# Patient Record
Sex: FEMALE | Race: WHITE | NOT HISPANIC OR LATINO | Employment: OTHER | URBAN - METROPOLITAN AREA
[De-identification: names, ages, dates, MRNs, and addresses within clinical notes are randomized per-mention and may not be internally consistent; named-entity substitution may affect disease eponyms.]

---

## 2022-11-14 ENCOUNTER — OFFICE VISIT (OUTPATIENT)
Dept: FAMILY MEDICINE | Facility: CLINIC | Age: 87
End: 2022-11-14
Payer: COMMERCIAL

## 2022-11-14 VITALS
HEART RATE: 90 BPM | DIASTOLIC BLOOD PRESSURE: 72 MMHG | SYSTOLIC BLOOD PRESSURE: 136 MMHG | OXYGEN SATURATION: 98 % | TEMPERATURE: 98.1 F

## 2022-11-14 DIAGNOSIS — J20.8 ACUTE BRONCHITIS DUE TO OTHER SPECIFIED ORGANISMS: Primary | ICD-10-CM

## 2022-11-14 PROCEDURE — 99203 OFFICE O/P NEW LOW 30 MIN: CPT

## 2022-11-14 RX ORDER — BENZONATATE 100 MG/1
100 CAPSULE ORAL 3 TIMES DAILY PRN
Qty: 30 CAPSULE | Refills: 0 | Status: SHIPPED | OUTPATIENT
Start: 2022-11-14

## 2022-11-14 RX ORDER — LOSARTAN POTASSIUM 50 MG/1
TABLET ORAL
COMMUNITY

## 2022-11-14 RX ORDER — AMOXICILLIN 500 MG/1
500 CAPSULE ORAL 2 TIMES DAILY
Qty: 20 CAPSULE | Refills: 0 | Status: SHIPPED | OUTPATIENT
Start: 2022-11-14 | End: 2022-11-24

## 2022-11-14 RX ORDER — ALBUTEROL SULFATE 90 UG/1
2 AEROSOL, METERED RESPIRATORY (INHALATION) EVERY 6 HOURS
Qty: 18 G | Refills: 0 | Status: SHIPPED | OUTPATIENT
Start: 2022-11-14

## 2022-11-14 ASSESSMENT — ENCOUNTER SYMPTOMS
CARDIOVASCULAR NEGATIVE: 1
CONSTITUTIONAL NEGATIVE: 1
COUGH: 1

## 2022-11-14 NOTE — PATIENT INSTRUCTIONS
Increase fluids with water, Pedialyte, Gatorade, or rehydrating beverages. Alternate Tylenol and Ibuprofen as needed for aches, pains or fever. Follow clear liquid to BRAT diet (bananas, rice, applesauce, toast) as needed for any upset stomach. Rest as much as possible. Use OTC cough and cold medication. Run humidifier at night. Follow up in clinic if symptoms persist or worsen. This usually can last 10-14 days.

## 2022-11-14 NOTE — PROGRESS NOTES
Assessment & Plan     Acute bronchitis due to other specified organisms  - benzonatate (TESSALON) 100 MG capsule  Dispense: 30 capsule; Refill: 0  - albuterol (PROAIR HFA/PROVENTIL HFA/VENTOLIN HFA) 108 (90 Base) MCG/ACT inhaler  Dispense: 18 g; Refill: 0  - amoxicillin (AMOXIL) 500 MG capsule  Dispense: 20 capsule; Refill: 0     If not improved by day 14, fever or worsening symptoms, to use Amoxicillin.    Increase fluids with water, Pedialyte, Gatorade, or rehydrating beverages. Alternate Tylenol and Ibuprofen as needed for aches, pains or fever. Follow clear liquid to BRAT diet (bananas, rice, applesauce, toast) as needed for any upset stomach. Rest as much as possible. Use OTC cough and cold medication. Run humidifier at night. Follow up in clinic if symptoms persist or worsen. This usually can last 10-14 days.       Return if symptoms worsen or fail to improve, for Follow up.    At the end of the encounter, I discussed results, diagnosis, medications. Discussed red flags for immediate return to clinic/ER, as well as indications for follow up if no improvement. Patient understood and agreed to plan. Patient was stable for discharge.    Subjective     Lizzeth is a 89 year old female who presents to clinic today with daughter for the following health issues:  Chief Complaint   Patient presents with     Cough     Has been going on for a while, short of breath easily, dry cough, no history of chronic breathing issues, dry throat, says she feels like she can never get a full breath of air, did a COVID test at home and it was negative on 11/10/2022     Lizzeth presents [facility] reports of cough x 1.5 weeks. She reports it is a dry cough, hard to catch breath. She had a negative COVID test. She has not tried any over the counter medications.           Review of Systems   Constitutional: Negative.    HENT: Negative.    Respiratory: Positive for cough.    Cardiovascular: Negative.    Skin: Negative.            Objective    BP  136/72 (BP Location: Right arm, Patient Position: Sitting, Cuff Size: Adult Regular)   Pulse 90   Temp 98.1  F (36.7  C) (Oral)   SpO2 98%   Physical Exam  Constitutional:       Appearance: Normal appearance.   HENT:      Head: Normocephalic and atraumatic.      Left Ear: There is impacted cerumen.      Nose: Congestion present.      Mouth/Throat:      Mouth: Mucous membranes are moist.      Pharynx: Oropharynx is clear.   Eyes:      Extraocular Movements: Extraocular movements intact.      Conjunctiva/sclera: Conjunctivae normal.      Pupils: Pupils are equal, round, and reactive to light.   Cardiovascular:      Rate and Rhythm: Normal rate and regular rhythm.      Heart sounds: Normal heart sounds.   Pulmonary:      Effort: Pulmonary effort is normal.      Breath sounds: Normal breath sounds.   Musculoskeletal:      Cervical back: Normal range of motion and neck supple.   Skin:     General: Skin is warm and dry.   Neurological:      General: No focal deficit present.      Mental Status: She is alert and oriented to person, place, and time.              Josse Calloway PA-C

## 2023-05-27 ENCOUNTER — HEALTH MAINTENANCE LETTER (OUTPATIENT)
Age: 88
End: 2023-05-27

## 2024-08-04 ENCOUNTER — HEALTH MAINTENANCE LETTER (OUTPATIENT)
Age: 89
End: 2024-08-04

## 2024-09-09 ENCOUNTER — APPOINTMENT (OUTPATIENT)
Dept: CT IMAGING | Facility: CLINIC | Age: 89
End: 2024-09-09
Attending: EMERGENCY MEDICINE
Payer: COMMERCIAL

## 2024-09-09 ENCOUNTER — APPOINTMENT (OUTPATIENT)
Dept: GENERAL RADIOLOGY | Facility: CLINIC | Age: 89
End: 2024-09-09
Payer: COMMERCIAL

## 2024-09-09 ENCOUNTER — HOSPITAL ENCOUNTER (EMERGENCY)
Facility: CLINIC | Age: 89
Discharge: HOME | End: 2024-09-09
Attending: EMERGENCY MEDICINE | Admitting: EMERGENCY MEDICINE
Payer: COMMERCIAL

## 2024-09-09 VITALS
DIASTOLIC BLOOD PRESSURE: 66 MMHG | WEIGHT: 121 LBS | TEMPERATURE: 97.7 F | HEART RATE: 89 BPM | OXYGEN SATURATION: 94 % | RESPIRATION RATE: 16 BRPM | SYSTOLIC BLOOD PRESSURE: 143 MMHG

## 2024-09-09 DIAGNOSIS — S32.10XA CLOSED FRACTURE OF SACRUM, UNSPECIFIED PORTION OF SACRUM, INITIAL ENCOUNTER (H): ICD-10-CM

## 2024-09-09 DIAGNOSIS — K57.92 DIVERTICULITIS: ICD-10-CM

## 2024-09-09 LAB
ALBUMIN UR-MCNC: NEGATIVE MG/DL
AMORPH CRY #/AREA URNS HPF: ABNORMAL /HPF
ANION GAP SERPL CALCULATED.3IONS-SCNC: 14 MMOL/L (ref 7–15)
APPEARANCE UR: CLEAR
BASOPHILS # BLD AUTO: 0 10E3/UL (ref 0–0.2)
BASOPHILS NFR BLD AUTO: 0 %
BILIRUB UR QL STRIP: NEGATIVE
BUN SERPL-MCNC: 24.5 MG/DL (ref 8–23)
CALCIUM SERPL-MCNC: 9.6 MG/DL (ref 8.8–10.4)
CHLORIDE SERPL-SCNC: 98 MMOL/L (ref 98–107)
CK SERPL-CCNC: 142 U/L (ref 26–192)
COLOR UR AUTO: ABNORMAL
CREAT SERPL-MCNC: 1.11 MG/DL (ref 0.51–0.95)
EGFRCR SERPLBLD CKD-EPI 2021: 46 ML/MIN/1.73M2
EOSINOPHIL # BLD AUTO: 0 10E3/UL (ref 0–0.7)
EOSINOPHIL NFR BLD AUTO: 0 %
ERYTHROCYTE [DISTWIDTH] IN BLOOD BY AUTOMATED COUNT: 12.9 % (ref 10–15)
FLUAV RNA SPEC QL NAA+PROBE: NEGATIVE
FLUBV RNA RESP QL NAA+PROBE: NEGATIVE
GLUCOSE SERPL-MCNC: 128 MG/DL (ref 70–99)
GLUCOSE UR STRIP-MCNC: NEGATIVE MG/DL
HCO3 SERPL-SCNC: 22 MMOL/L (ref 22–29)
HCT VFR BLD AUTO: 39.8 % (ref 35–47)
HGB BLD-MCNC: 13.4 G/DL (ref 11.7–15.7)
HGB UR QL STRIP: NEGATIVE
IMM GRANULOCYTES # BLD: 0.1 10E3/UL
IMM GRANULOCYTES NFR BLD: 1 %
KETONES UR STRIP-MCNC: 10 MG/DL
LEUKOCYTE ESTERASE UR QL STRIP: NEGATIVE
LYMPHOCYTES # BLD AUTO: 0.8 10E3/UL (ref 0.8–5.3)
LYMPHOCYTES NFR BLD AUTO: 8 %
MAGNESIUM SERPL-MCNC: 2 MG/DL (ref 1.7–2.3)
MCH RBC QN AUTO: 31.2 PG (ref 26.5–33)
MCHC RBC AUTO-ENTMCNC: 33.7 G/DL (ref 31.5–36.5)
MCV RBC AUTO: 93 FL (ref 78–100)
MONOCYTES # BLD AUTO: 1 10E3/UL (ref 0–1.3)
MONOCYTES NFR BLD AUTO: 10 %
MUCOUS THREADS #/AREA URNS LPF: PRESENT /LPF
NEUTROPHILS # BLD AUTO: 8.1 10E3/UL (ref 1.6–8.3)
NEUTROPHILS NFR BLD AUTO: 81 %
NITRATE UR QL: NEGATIVE
NRBC # BLD AUTO: 0 10E3/UL
NRBC BLD AUTO-RTO: 0 /100
PH UR STRIP: 5.5 [PH] (ref 5–7)
PLATELET # BLD AUTO: 256 10E3/UL (ref 150–450)
POTASSIUM SERPL-SCNC: 4.1 MMOL/L (ref 3.4–5.3)
RBC # BLD AUTO: 4.29 10E6/UL (ref 3.8–5.2)
RBC URINE: <1 /HPF
RSV RNA SPEC NAA+PROBE: NEGATIVE
SARS-COV-2 RNA RESP QL NAA+PROBE: NEGATIVE
SODIUM SERPL-SCNC: 134 MMOL/L (ref 135–145)
SP GR UR STRIP: 1.01 (ref 1–1.03)
SQUAMOUS EPITHELIAL: <1 /HPF
UROBILINOGEN UR STRIP-MCNC: NORMAL MG/DL
WBC # BLD AUTO: 9.9 10E3/UL (ref 4–11)
WBC URINE: 0 /HPF

## 2024-09-09 PROCEDURE — 72192 CT PELVIS W/O DYE: CPT

## 2024-09-09 PROCEDURE — 36415 COLL VENOUS BLD VENIPUNCTURE: CPT

## 2024-09-09 PROCEDURE — 83735 ASSAY OF MAGNESIUM: CPT | Performed by: EMERGENCY MEDICINE

## 2024-09-09 PROCEDURE — 96360 HYDRATION IV INFUSION INIT: CPT | Mod: 59

## 2024-09-09 PROCEDURE — 70450 CT HEAD/BRAIN W/O DYE: CPT

## 2024-09-09 PROCEDURE — 82374 ASSAY BLOOD CARBON DIOXIDE: CPT

## 2024-09-09 PROCEDURE — 85025 COMPLETE CBC W/AUTO DIFF WBC: CPT

## 2024-09-09 PROCEDURE — 96361 HYDRATE IV INFUSION ADD-ON: CPT

## 2024-09-09 PROCEDURE — 82565 ASSAY OF CREATININE: CPT

## 2024-09-09 PROCEDURE — 99284 EMERGENCY DEPT VISIT MOD MDM: CPT | Mod: 25

## 2024-09-09 PROCEDURE — 72131 CT LUMBAR SPINE W/O DYE: CPT

## 2024-09-09 PROCEDURE — 258N000003 HC RX IP 258 OP 636: Performed by: EMERGENCY MEDICINE

## 2024-09-09 PROCEDURE — 87637 SARSCOV2&INF A&B&RSV AMP PRB: CPT

## 2024-09-09 PROCEDURE — 71046 X-RAY EXAM CHEST 2 VIEWS: CPT

## 2024-09-09 PROCEDURE — 82550 ASSAY OF CK (CPK): CPT | Performed by: EMERGENCY MEDICINE

## 2024-09-09 PROCEDURE — 81003 URINALYSIS AUTO W/O SCOPE: CPT

## 2024-09-09 RX ADMIN — SODIUM CHLORIDE 1000 ML: 9 INJECTION, SOLUTION INTRAVENOUS at 07:07

## 2024-09-09 ASSESSMENT — COLUMBIA-SUICIDE SEVERITY RATING SCALE - C-SSRS
1. IN THE PAST MONTH, HAVE YOU WISHED YOU WERE DEAD OR WISHED YOU COULD GO TO SLEEP AND NOT WAKE UP?: NO
6. HAVE YOU EVER DONE ANYTHING, STARTED TO DO ANYTHING, OR PREPARED TO DO ANYTHING TO END YOUR LIFE?: NO
2. HAVE YOU ACTUALLY HAD ANY THOUGHTS OF KILLING YOURSELF IN THE PAST MONTH?: NO

## 2024-09-09 ASSESSMENT — ACTIVITIES OF DAILY LIVING (ADL)
ADLS_ACUITY_SCORE: 33
ADLS_ACUITY_SCORE: 35

## 2024-09-09 NOTE — ED PROVIDER NOTES
ED APC SUPERVISION NOTE:   I evaluated this patient in conjunction with Svetlana Fitzgerald PA-C  I have participated in the care of the patient and personally performed key elements of the history, exam, and medical decision making.      HPI:   Lizzeth العلي is a 89 year old female with a history of hypertension and new Alzheimer's diagnosis who presents to the ED with generalized body aches and hallucinations. She explains that over the past day she has developed generalized weakness and body aches that have gradually worsened since onset. She notes her body aches are most severe at her lower back near the tailbone. She adds that yesterday evening she experienced visual hallucinations. She states she is not having these hallucinations now. It is unclear whether she has suffered similar such hallucinations in the past. She denies vomiting, diarrhea, constipation, dysuria, frequency, or urgency. She has not suffered any recent falls/trauma.     Independent Historian:   Patients daughter accompanies her in the ED and aids in providing history.     Review of External Notes:   Reviewed patient's office visit from 7/22/2024, patient was seen for her Medicare annual visit.  Saw her neurologist and was diagnosed with Alzheimer's, was started on donepezil and Namenda.  She lives in 1 level home independently.  Family comes over to give her meds and helps her with her meals.  She has not had any reported falls.  She does have uterine prolapse, but could not tolerate a pessary.  Was not having any problems with urination or bowel movements.     EXAM:   General: Well-nourished, resting comfortably when I enter the room  Eyes: Pupils equal, conjunctivae pink no scleral icterus or conjunctival injection  ENT:  Moist mucus membranes  Respiratory:  Lungs clear to auscultation bilaterally, no crackles/rubs/wheezes.  Good air movement  CV: Normal rate and rhythm, no murmurs  GI:  Abdomen soft and non-distended.  No  tenderness, guarding or rebound  Skin: Warm, dry.  No rashes or petechiae  Musculoskeletal: No peripheral edema or calf tenderness. Tenderness to palpation of the tailbone. No bruising or abrasions. No midline tenderness of the rest of the spine.   Neuro: Alert and oriented to person/place/time. 5/5 strength in the upper and lower extremities. Sensation intact in the upper and lower extremities.  Psychiatric: Normal affect    Independent Interpretation (X-rays, CTs, rhythm strip):  None    Consultations/Discussion of Management or Tests:  None     Social Determinants of Health affecting care:   None     MEDICAL DECISION MAKING/ASSESSMENT AND PLAN:   89-year-old female with a history of hypertension and Alzheimer's presents to the emergency department with a complaint of generalized bodyaches and hallucinations.  Patient reports that she started to have body aches last night.  She also reports seeing things that were not there.  She is not currently having hallucinations.  She is not having any pain with urination.  No fevers, vomiting, abdominal pain, diarrhea.  She states that she took some Tylenol this morning but her pain is still there.  She localizes the pain to her tailbone, but also complains of achiness in her low back.  She denies any falls.  She does live alone and her family does check on her.  On exam patient is well-appearing.  She is able to move around in the bed with ease.  She rolls over for an exam of her back.  No bruising, abrasions, step-offs.  There is some slight tenderness to palpation of her tailbone and lumbar spine.  No neurologic deficits.  Abdomen is soft and nontender.  Workup does not reveal any flu, COVID, RSV.  No signs of urinary tract infection.  She is not in rhabdomyolysis.  Patient's creatinine is about at her baseline, and she is given a liter of fluid here.  Other electrolytes are within acceptable limits.  No leukocytosis.  Head CT does not show any acute findings.  CT of the  lumbar spine does not show any acute findings.  CT of the sacrum shows acute diverticulitis without abscess or perforation as well as a chronic sacral fracture.  Patient is not having any bloody stools.  Patient will be placed on Augmentin.  I am not sure if her hallucinations are secondary to her infection, or progression of her Alzheimer's.  She will follow-up with primary care.  She will also follow-up with primary care for the chronic sacral fracture, I do not think that she needs to see orthopedics emergently, as this does appear chronic.  Patient's vital signs have resolved, and her tachycardia has resolved after fluid rehydration.  Patient and her family feel comfortable with her going home.  Patient is discharged.     DIAGNOSIS:     ICD-10-CM    1. Diverticulitis  K57.92       2. Closed fracture of sacrum, unspecified portion of sacrum, initial encounter (H)  S32.10XA           DISPOSITION:   Patient is discharged home     Scribe Disclosure:  CHAR PIRES, am serving as a scribe at 6:37 AM on 9/9/2024 to document services personally performed by Anne Lane MD based on my observations and the provider's statements to me.     9/9/2024  [facility] EMERGENCY DEPT       Anne Lane MD  09/09/24 1034

## 2024-09-09 NOTE — ED TRIAGE NOTES
Pt brought in by family for nontraumatic back pain last two weeks that has been getting worse. Pt lives alone, but family checks on pt daily - more weak in the last couple days and daughter reports hallucinations since yesterday. Denies fevers. Denies burning with urination, but reports chronic frequency with urination.      Triage Assessment (Adult)       Row Name 09/09/24 0628          Respiratory WDL    Respiratory WDL WDL        Cardiac WDL    Cardiac WDL WDL        Cognitive/Neuro/Behavioral WDL    Cognitive/Neuro/Behavioral WDL WDL

## 2024-09-09 NOTE — ED PROVIDER NOTES
"  Emergency Department Note      History of Present Illness     Chief Complaint   Hallucinations and Generalized Body Aches      HPI   Lizzeth العلي is a 89 year old female with a history of hypertension and Alzheimer's disease who presents to the ED with generalized weakness and hallucinations.  Patient currently lives at home independently with significant help from family.  They report that over the past few days she has been having generalized weakness.  Last night she felt too weak to get up out of bed.  Additionally the patient reports that she started \"seeing things\" that were not there.  Reports that this has not happened to her before in the past.  Does additionally report that she had with her daughter thinks is atraumatic low back pain that began 2 weeks ago.  She has been taking Tylenol for this as needed.  Patient denies fevers.  Does have what she describes as a chronic cough.  No chest pain or shortness of breath.  Denies abdominal pain.  Does wear briefs at baseline.  Daughter does report that they do bring the patient food and water and she has been eating small amounts but this is typical for her.  Denies falls.    Independent Historian   Daughter as detailed above.    Review of External Notes   None     Past Medical History     Medical History and Problem List   Abdominal hernia  Iron deficiency anemia  CKD, stage 3  Hypertension  Pseudophakia  Alzheimer's disease  B12 Deficiency  Ventral hernia  Cholelithiasis      Medications   Albuterol  Tessalon  Fergon  Cozaar  Vitamin B-12  Senokot  Dilaudid  Aricept  Nameda    Surgical History   Tonsillectomy  Colonoscopy  Hysterectomy  Cataract removal   Inguinal hernia repair  AMBER and BSO    Physical Exam     Patient Vitals for the past 24 hrs:   BP Temp Pulse Resp SpO2 Weight   09/09/24 1034 -- -- -- -- 94 % --   09/09/24 1033 (!) 143/66 -- 89 -- -- --   09/09/24 0627 (!) 151/60 97.7  F (36.5  C) 110 16 94 % 54.9 kg (121 lb)     Physical " Exam  General: Awake, alert, non-toxic. Elderly female.   Head:  Scalp is atraumatic.   Eyes:  Conjunctiva normal, PERRL  ENT:  The external nose and ears are normal.     Dry mucous membranes. Oropharynx clear, uvula midline.  Neck:  Normal range of motion without rigidity.  CV:  Regular rate and rhythm    No pathologic murmur, rubs, or gallops.  Resp:  Breath sounds are clear bilaterally    Non-labored, no retractions or accessory muscle use  Abdomen: Abdomen is soft, no distension, no tenderness, no masses. No CVA tenderness.  MS:  No lower extremity edema/swelling. Mild tenderness to palpation of sacrum. No midline cervical, thoracic, or lumbar tenderness.  Extremities without joint swelling or redness.  Skin:  Warm and dry, No rash or lesions noted.  Neuro:  Alert and oriented.  GCS 15 Moves all extremities normal.  No facial asymmetry. Gait normal.  Psych: Awake. Alert. Normal affect. Appropriate interactions.      Diagnostics     Lab Results   Labs Ordered and Resulted from Time of ED Arrival to Time of ED Departure   BASIC METABOLIC PANEL - Abnormal       Result Value    Sodium 134 (*)     Potassium 4.1      Chloride 98      Carbon Dioxide (CO2) 22      Anion Gap 14      Urea Nitrogen 24.5 (*)     Creatinine 1.11 (*)     GFR Estimate 46 (*)     Calcium 9.6      Glucose 128 (*)    ROUTINE UA WITH MICROSCOPIC REFLEX TO CULTURE - Abnormal    Color Urine Light Yellow      Appearance Urine Clear      Glucose Urine Negative      Bilirubin Urine Negative      Ketones Urine 10 (*)     Specific Gravity Urine 1.009      Blood Urine Negative      pH Urine 5.5      Protein Albumin Urine Negative      Urobilinogen Urine Normal      Nitrite Urine Negative      Leukocyte Esterase Urine Negative      Mucus Urine Present (*)     Amorphous Crystals Urine Few (*)     RBC Urine <1      WBC Urine 0      Squamous Epithelials Urine <1     INFLUENZA A/B, RSV, & SARS-COV2 PCR - Normal    Influenza A PCR Negative      Influenza B PCR  Negative      RSV PCR Negative      SARS CoV2 PCR Negative     CK TOTAL - Normal         MAGNESIUM - Normal    Magnesium 2.0     CBC WITH PLATELETS AND DIFFERENTIAL    WBC Count 9.9      RBC Count 4.29      Hemoglobin 13.4      Hematocrit 39.8      MCV 93      MCH 31.2      MCHC 33.7      RDW 12.9      Platelet Count 256      % Neutrophils 81      % Lymphocytes 8      % Monocytes 10      % Eosinophils 0      % Basophils 0      % Immature Granulocytes 1      NRBCs per 100 WBC 0      Absolute Neutrophils 8.1      Absolute Lymphocytes 0.8      Absolute Monocytes 1.0      Absolute Eosinophils 0.0      Absolute Basophils 0.0      Absolute Immature Granulocytes 0.1      Absolute NRBCs 0.0         Imaging   CT Pelvis Bone wo Contrast   Final Result   IMPRESSION:   1.  Colonic diverticulosis with CT findings concerning for acute   sigmoid diverticulitis near the junction of the rectum and sigmoid   colon. Considerable left-sided pericolonic inflammatory stranding and   focal free fluid in the left pelvis inferiorly. No definitive   well-defined drainable fluid collection currently. Follow up until   resolution is achieved is recommended.   2.  Age uncertain more likely subacute/chronic minimally displaced mid   sacral fracture.   3.  Degenerative change lower lumbar spine with grade 1   anterolisthesis of L4 on L5 and L5 on S1.   4.  Degenerative change both SI joints, symphysis pubis and each hip   joint.   5.  Additional incidental findings as detailed fully above.      NOTE: ABNORMAL REPORT      THE DICTATION ABOVE DESCRIBES AN ABNORMAL REPORT FOR WHICH FOLLOW-UP   IS NEEDED.            MIHAI BEE MD            SYSTEM ID:  [id number]      CT Lumbar Spine w/o Contrast   Final Result   IMPRESSION:     1. No acute fracture or subluxation in the lumbar spine.   2. Degenerative changes in lumbar spine as detailed above. No spinal   canal narrowing at any level. Mild right neural foraminal narrowing at   L2-L3.        CARLOS ALBERTO HARRIS MD            SYSTEM ID:  [id number]      Head CT w/o contrast   Final Result   IMPRESSION:      1. No evidence of acute intracranial hemorrhage, mass, or herniation.   2. Mild diffuse parenchymal volume loss and white matter changes   likely due to chronic microvascular ischemic disease.         CARLOS ALBERTO HARRIS MD            SYSTEM ID:  [id number]      XR Chest 2 Views   Final Result   IMPRESSION: Bibasilar atelectasis. No pleural effusion or   pneumothorax. Normal heart size. Stable to slight increased size of   the large hiatal hernia. Left humerus plate and screw fixation.      DENEEN MEYERS MD            SYSTEM ID:  [id number]        Independent Interpretation   None    ED Course      Medications Administered   Medications   sodium chloride 0.9% BOLUS 1,000 mL (0 mLs Intravenous Stopped 9/9/24 1029)       Procedures   Procedures     Discussion of Management   None    ED Course        Additional Documentation  None    Medical Decision Making / Diagnosis     CMS Diagnoses: None    MIPS       None    MDM   Lizzeth العلي is a 89 year old female who presented to the emergency department for evaluation of generalized weakness and hallucinations.  See HPI for further details.  On exam the patient is hemodynamically stable. Dry mucous membranes.  There is some mild tenderness to palpation of the sacrum.  Broad workup pursued.  COVID and influenza swab negative.  Urinalysis without evidence of infection.  BMP did show mild hyponatremia and mild increase in creatinine, consistent with dehydration.  Patient given 1 L of fluids here in the ED.  CK within normal limits.  Other electrolytes are within normal limits.  Due to the patient having Alzheimer's and living at home somewhat independently, CT of the head ordered which thankfully showed no evidence of acute intracranial hemorrhage.  Lumbar spine CT showed no evidence of fracture.  CT of the pelvis did show incidental findings of acute sigmoid diverticulitis.   Additional finding of a subacute/chronic sacral fracture.  Chest x-ray unremarkable.  Patient denies any hematochezia or diarrhea.  She has no left lower quadrant pain to palpation.  However due to the patient's general malaise and CT findings, elected to treat for diverticulitis.  Patient is at her baseline.  Denies any hallucinations.  A discussion was had with daughters present who report that they feel comfortable with her going home.  Patient discharged home with a course of Augmentin.  Do suspect some of her hallucinations may be due to progression of Alzheimer's versus infection.  Recommend the patient follow-up with her primary care provider for further evaluation of hallucinations and management of the sacral fracture.  Do not think she requires emergent orthopedics referral as this fracture does appear chronic.  All questions answered.  Return precautions discussed.  Patient discharged home.    Disposition   The patient was discharged.     Diagnosis     ICD-10-CM    1. Diverticulitis  K57.92       2. Closed fracture of sacrum, unspecified portion of sacrum, initial encounter (H)  S32.10XA            Discharge Medications   Discharge Medication List as of 9/9/2024 10:29 AM        START taking these medications    Details   amoxicillin-clavulanate (AUGMENTIN) 875-125 MG tablet Take 1 tablet by mouth every 8 hours for 5 days., Disp-15 tablet, R-0, E-Prescribe               TRIP Price THOMAS BRUMM, am serving as a scribe at 6:32 AM on 9/9/2024 to document services personally performed by Svetlana Fitzgerald PA-C based on my observations and the provider's statements to me.      Svetlana Fitzgerald PA-C  09/09/24 0339

## 2024-09-09 NOTE — DISCHARGE INSTRUCTIONS
Discharge Instructions  Diverticulitis    Your provider has diagnosed you with diverticulitis.  Diverticulitis is inflammation of a diverticulum, which is a tiny sack-like structure that protrudes off the wall of the colon (large intestine).  These sacks are created over years of increased pressure in the colon (this is diverticulosis), usually as a result of a diet without enough fruits, vegetables, and whole grains. Diverticulosis doesn't cause symptoms.    When these sacks get inflamed, it is called divericulitis.  Diverticulitis causes abdominal (belly) pain, fever, nausea (sick to stomach), and vomiting (throwing up). Diverticulitis is usually treated at home.  However, sometimes diverticulitis needs treatment in the hospital and may even need surgery.      Generally, every Emergency Department visit should have a follow-up clinic visit with either a primary or a specialty clinic/provider. Please follow-up as instructed by your emergency provider today.    Return to the Emergency Department if:   You get an oral temperature above 102oF or as directed by your provider.  You have blood in your stools (bright red or black, tarry stools), or have blood in your vomit.  You keep vomiting or cannot drink liquids or cannot keep your medicine down.  You cannot have a bowel movement or you cannot pass gas.  Your stomach gets bloated or bigger.  You faint or become very weak.  Your pain is too bad to tolerate.  You have new symptoms or anything that worries you.    What can I do to help myself? How is diverticulitis treated?  Diverticulitis can be treated without antibiotics in many cases. In the past, diverticulitis was routinely treated with antibiotics.  However, research over the past decade has found that antibiotic use in most cases of diverticulitis does not improve healing because the process may be more inflammatory, rather than infectious.  Antibiotics also wipe out good, healthy gut bacteria, which we are  "increasingly aware are important to overall health.  Some patients with certain medical conditions, lab results, or duration of symptoms may still need antibiotics. If you were directed to take antibiotics, take them right away and be sure to finish the whole antibiotic prescription.  Regardless of whether or not you are treated with antibiotics, for the first day or two at home drink only clear liquids.  This lets your intestines rest.  If your pain has improved after one or two days, you may start eating mild foods. Soda crackers, toast, plain noodles, gelatin, applesauce and bananas are good first choices.  Avoid foods that have acid, are spicy, fatty or fibrous (such as meats, coarse grains, vegetables). You may start eating these foods again in about 3-4 days when you are better.  Once you are back to normal, eat a high fiber diet of fruits, vegetables and whole grains. Some people think you should avoid eating nuts, seeds, and corn, but there is no definite proof this makes any difference in whether you will get diverticulitis again.   Pain and fever can be treated with acetaminophen (Tylenol ), ibuprofen (Advil ) or you might have been prescribed a pain medication.  Hot packs or a heating pad may also feel good.    When Should I Follow Up After I Am Feeling Better?  Follow-up as directed by your provider during your visit.  If this was your first episode of diverticulitis, you should have a colonoscopy within 6-8 weeks because in a small number of patients, malignancy (cancer) can look like diverticulitis.   You may not need a colonoscopy if you recently had one. Talk to your primary care doctor to determine if you need a colonoscopy.    Probiotics: If you have been given an antibiotic, you may want to also take a probiotic pill or eat yogurt with live cultures. Probiotics have \"good bacteria\" to help your intestines stay healthy. Studies have shown that probiotics help prevent diarrhea and other intestine " problems (including C. diff infection) when you take antibiotics. You can buy these without a prescription in the pharmacy section of the store.    If you were given a prescription for medicine here today, be sure to read all of the information (including the package insert) that comes with your prescription.  This will include important information about the medicine, its side effects, and any warnings that you need to know about.  The pharmacist who fills the prescription can provide more information and answer questions you may have about the medicine.  If you have questions or concerns that the pharmacist cannot address, please call or return to the Emergency Department.     Remember that you can always come back to the Emergency Department if you are not able to see your regular provider in the amount of time listed above, if you get any new symptoms, or if there is anything that worries you.      If anything changes please return to the ED.

## 2025-03-13 ENCOUNTER — APPOINTMENT (OUTPATIENT)
Dept: CT IMAGING | Facility: CLINIC | Age: OVER 89
End: 2025-03-13
Attending: EMERGENCY MEDICINE
Payer: COMMERCIAL

## 2025-03-13 ENCOUNTER — HOSPITAL ENCOUNTER (EMERGENCY)
Facility: CLINIC | Age: OVER 89
Discharge: HOME | End: 2025-03-13
Attending: EMERGENCY MEDICINE
Payer: COMMERCIAL

## 2025-03-13 VITALS
DIASTOLIC BLOOD PRESSURE: 81 MMHG | RESPIRATION RATE: 16 BRPM | OXYGEN SATURATION: 94 % | HEART RATE: 84 BPM | SYSTOLIC BLOOD PRESSURE: 161 MMHG | TEMPERATURE: 98.1 F

## 2025-03-13 DIAGNOSIS — L03.211 CELLULITIS OF FACE: ICD-10-CM

## 2025-03-13 DIAGNOSIS — B02.9 HERPES ZOSTER WITHOUT COMPLICATION: ICD-10-CM

## 2025-03-13 LAB
ANION GAP SERPL CALCULATED.3IONS-SCNC: 8 MMOL/L (ref 7–15)
BASOPHILS # BLD AUTO: 0 10E3/UL (ref 0–0.2)
BASOPHILS NFR BLD AUTO: 1 %
BUN SERPL-MCNC: 20.4 MG/DL (ref 8–23)
CALCIUM SERPL-MCNC: 9.3 MG/DL (ref 8.8–10.4)
CHLORIDE SERPL-SCNC: 99 MMOL/L (ref 98–107)
CREAT SERPL-MCNC: 0.9 MG/DL (ref 0.51–0.95)
EGFRCR SERPLBLD CKD-EPI 2021: 59 ML/MIN/1.73M2
EOSINOPHIL # BLD AUTO: 0.1 10E3/UL (ref 0–0.7)
EOSINOPHIL NFR BLD AUTO: 1 %
ERYTHROCYTE [DISTWIDTH] IN BLOOD BY AUTOMATED COUNT: 13.4 % (ref 10–15)
GLUCOSE SERPL-MCNC: 114 MG/DL (ref 70–99)
HCO3 SERPL-SCNC: 27 MMOL/L (ref 22–29)
HCT VFR BLD AUTO: 41.6 % (ref 35–47)
HGB BLD-MCNC: 13.6 G/DL (ref 11.7–15.7)
HOLD SPECIMEN: NORMAL
IMM GRANULOCYTES # BLD: 0 10E3/UL
IMM GRANULOCYTES NFR BLD: 0 %
LYMPHOCYTES # BLD AUTO: 0.7 10E3/UL (ref 0.8–5.3)
LYMPHOCYTES NFR BLD AUTO: 14 %
MCH RBC QN AUTO: 30 PG (ref 26.5–33)
MCHC RBC AUTO-ENTMCNC: 32.7 G/DL (ref 31.5–36.5)
MCV RBC AUTO: 92 FL (ref 78–100)
MONOCYTES # BLD AUTO: 0.8 10E3/UL (ref 0–1.3)
MONOCYTES NFR BLD AUTO: 15 %
NEUTROPHILS # BLD AUTO: 3.4 10E3/UL (ref 1.6–8.3)
NEUTROPHILS NFR BLD AUTO: 69 %
NRBC # BLD AUTO: 0 10E3/UL
NRBC BLD AUTO-RTO: 0 /100
PLATELET # BLD AUTO: 224 10E3/UL (ref 150–450)
POTASSIUM SERPL-SCNC: 5.7 MMOL/L (ref 3.4–5.3)
RBC # BLD AUTO: 4.53 10E6/UL (ref 3.8–5.2)
SODIUM SERPL-SCNC: 134 MMOL/L (ref 135–145)
WBC # BLD AUTO: 5 10E3/UL (ref 4–11)

## 2025-03-13 PROCEDURE — 250N000011 HC RX IP 250 OP 636: Performed by: EMERGENCY MEDICINE

## 2025-03-13 PROCEDURE — 70481 CT ORBIT/EAR/FOSSA W/DYE: CPT

## 2025-03-13 PROCEDURE — 99285 EMERGENCY DEPT VISIT HI MDM: CPT | Mod: 25

## 2025-03-13 PROCEDURE — 85004 AUTOMATED DIFF WBC COUNT: CPT | Performed by: EMERGENCY MEDICINE

## 2025-03-13 PROCEDURE — 250N000009 HC RX 250: Performed by: EMERGENCY MEDICINE

## 2025-03-13 PROCEDURE — 70450 CT HEAD/BRAIN W/O DYE: CPT

## 2025-03-13 PROCEDURE — 80048 BASIC METABOLIC PNL TOTAL CA: CPT | Performed by: EMERGENCY MEDICINE

## 2025-03-13 PROCEDURE — 85041 AUTOMATED RBC COUNT: CPT | Performed by: EMERGENCY MEDICINE

## 2025-03-13 PROCEDURE — 36415 COLL VENOUS BLD VENIPUNCTURE: CPT | Performed by: EMERGENCY MEDICINE

## 2025-03-13 PROCEDURE — 85018 HEMOGLOBIN: CPT | Performed by: EMERGENCY MEDICINE

## 2025-03-13 PROCEDURE — 82565 ASSAY OF CREATININE: CPT | Performed by: EMERGENCY MEDICINE

## 2025-03-13 RX ORDER — TETRACAINE HYDROCHLORIDE 5 MG/ML
2 SOLUTION OPHTHALMIC ONCE
Status: COMPLETED | OUTPATIENT
Start: 2025-03-13 | End: 2025-03-13

## 2025-03-13 RX ORDER — CEPHALEXIN 500 MG/1
500 CAPSULE ORAL 4 TIMES DAILY
Qty: 40 CAPSULE | Refills: 0 | Status: SHIPPED | OUTPATIENT
Start: 2025-03-13 | End: 2025-03-23

## 2025-03-13 RX ORDER — TRIFLURIDINE 10 MG/ML
1 SOLUTION OPHTHALMIC
Qty: 7.5 ML | Refills: 0 | Status: SHIPPED | OUTPATIENT
Start: 2025-03-13 | End: 2025-03-20

## 2025-03-13 RX ORDER — IOPAMIDOL 755 MG/ML
67 INJECTION, SOLUTION INTRAVASCULAR ONCE
Status: COMPLETED | OUTPATIENT
Start: 2025-03-13 | End: 2025-03-13

## 2025-03-13 RX ORDER — VALACYCLOVIR HYDROCHLORIDE 1 G/1
1000 TABLET, FILM COATED ORAL 3 TIMES DAILY
Qty: 21 TABLET | Refills: 0 | Status: SHIPPED | OUTPATIENT
Start: 2025-03-13 | End: 2025-03-20

## 2025-03-13 RX ADMIN — IOPAMIDOL 67 ML: 755 INJECTION, SOLUTION INTRAVENOUS at 13:31

## 2025-03-13 RX ADMIN — SODIUM CHLORIDE 60 ML: 9 INJECTION, SOLUTION INTRAVENOUS at 13:31

## 2025-03-13 RX ADMIN — TETRACAINE HYDROCHLORIDE 2 DROP: 5 SOLUTION OPHTHALMIC at 12:43

## 2025-03-13 RX ADMIN — FLUORESCEIN SODIUM 1 STRIP: 1 STRIP OPHTHALMIC at 12:43

## 2025-03-13 ASSESSMENT — ACTIVITIES OF DAILY LIVING (ADL)
ADLS_ACUITY_SCORE: 41

## 2025-03-13 ASSESSMENT — COLUMBIA-SUICIDE SEVERITY RATING SCALE - C-SSRS
1. IN THE PAST MONTH, HAVE YOU WISHED YOU WERE DEAD OR WISHED YOU COULD GO TO SLEEP AND NOT WAKE UP?: NO
2. HAVE YOU ACTUALLY HAD ANY THOUGHTS OF KILLING YOURSELF IN THE PAST MONTH?: NO
6. HAVE YOU EVER DONE ANYTHING, STARTED TO DO ANYTHING, OR PREPARED TO DO ANYTHING TO END YOUR LIFE?: NO

## 2025-03-13 NOTE — ED TRIAGE NOTES
Pt brought in by daughters. Pt lives alone and is taken care of by her six children. Several days ago had an unwitnessed fall and has swelling and abrasions to left side of face and eye. Denies other injuries. Daughter states dementia at baseline. In triage breathing seems a little short.      Triage Assessment (Adult)       Row Name 03/13/25 1055          Triage Assessment    Airway WDL WDL        Respiratory WDL    Respiratory WDL --  SOB in triage        Skin Circulation/Temperature WDL    Skin Circulation/Temperature WDL --  abrasion and swelling to left side of face        Cardiac WDL    Cardiac WDL WDL        Peripheral/Neurovascular WDL    Peripheral Neurovascular WDL WDL        Cognitive/Neuro/Behavioral WDL    Cognitive/Neuro/Behavioral WDL --  dementia at baseline

## 2025-03-13 NOTE — DISCHARGE INSTRUCTIONS
Return to the ER for fever, increased redness or pain, increased irritation of the eye or decreased vision, or any new concerns.

## 2025-03-13 NOTE — ED PROVIDER NOTES
Emergency Department Note      History of Present Illness     Chief Complaint   Fall      HPI   Lizzeth العلي is a 89 year old female who presents to the ED with her 2 daughters.  She has dementia but is able to live independently with the care of her children.  Her family believes she fell 3 days ago and struck her face.  Since then she has had increased swelling and erythema over the left forehead.  The patient himself is unable to recall.  Has been no fevers or vomiting.  No other associated pain or injuries.    Independent Historian   History provided by the patient's 2 daughters.    Past Medical History     Medical History and Problem List   Past Medical History:   Diagnosis Date    Abdominal hernia        Medications   cephALEXin (KEFLEX) 500 MG capsule  ganciclovir (ZIRGAN) 0.15 % ophthalmic gel  valACYclovir (VALTREX) 1000 mg tablet  albuterol (PROAIR HFA/PROVENTIL HFA/VENTOLIN HFA) 108 (90 Base) MCG/ACT inhaler  benzonatate (TESSALON) 100 MG capsule  ferrous gluconate (FERGON) 324 (38 FE) MG tablet  losartan (COZAAR) 50 MG tablet  trifluridine (VIROPTIC) 1 % ophthalmic solution        Surgical History   Past Surgical History:   Procedure Laterality Date    GI SURGERY      TONSILLECTOMY         Physical Exam     Patient Vitals for the past 24 hrs:   BP Temp Temp src Pulse Resp SpO2   03/13/25 1055 (!) 161/81 98.1  F (36.7  C) Oral 84 16 94 %     Physical Exam  Constitutional:       General: She is not in acute distress.     Appearance: Normal appearance. She is not diaphoretic.   HENT:      Right Ear: Tympanic membrane, ear canal and external ear normal.      Left Ear: Tympanic membrane, ear canal and external ear normal.      Ears:      Comments: No lesions in the left external auditory canal or tympanic membrane.     Nose:      Comments: No nasal lesions.     Mouth/Throat:      Mouth: Mucous membranes are moist.   Eyes:      General: No scleral icterus.     Conjunctiva/sclera: Conjunctivae normal.    Cardiovascular:      Rate and Rhythm: Normal rate and regular rhythm.      Heart sounds: Normal heart sounds.   Pulmonary:      Effort: No respiratory distress.      Breath sounds: Normal breath sounds.   Abdominal:      General: Abdomen is flat.   Musculoskeletal:      Cervical back: Neck supple.   Skin:     General: Skin is warm.      Capillary Refill: Capillary refill takes less than 2 seconds.      Comments: There is an erythematous vesicular rash in the left V1 distribution including the left orbit and scalp.  She has a lesion of the left upper eyelid that is open and draining.   Neurological:      Mental Status: She is alert.      Comments: Alert.  Speech is fluent.  Grossly moves all extremities.   Psychiatric:         Mood and Affect: Mood normal.         Behavior: Behavior normal.           Diagnostics     Lab Results   Labs Ordered and Resulted from Time of ED Arrival to Time of ED Departure   BASIC METABOLIC PANEL - Abnormal       Result Value    Sodium 134 (*)     Potassium 5.7 (*)     Chloride 99      Carbon Dioxide (CO2) 27      Anion Gap 8      Urea Nitrogen 20.4      Creatinine 0.90      GFR Estimate 59 (*)     Calcium 9.3      Glucose 114 (*)    CBC WITH PLATELETS AND DIFFERENTIAL - Abnormal    WBC Count 5.0      RBC Count 4.53      Hemoglobin 13.6      Hematocrit 41.6      MCV 92      MCH 30.0      MCHC 32.7      RDW 13.4      Platelet Count 224      % Neutrophils 69      % Lymphocytes 14      % Monocytes 15      % Eosinophils 1      % Basophils 1      % Immature Granulocytes 0      NRBCs per 100 WBC 0      Absolute Neutrophils 3.4      Absolute Lymphocytes 0.7 (*)     Absolute Monocytes 0.8      Absolute Eosinophils 0.1      Absolute Basophils 0.0      Absolute Immature Granulocytes 0.0      Absolute NRBCs 0.0         Imaging   CT Orbits w Contrast   Final Result   IMPRESSION:   HEAD CT:   1.  No CT evidence for acute intracranial process.   2.  Frontal scalp swelling, potentially contusion,  without associated fracture.   3.  Brain atrophy and presumed chronic microvascular ischemic changes as above.      ORBIT CT:   1.  Left preseptal periorbital soft tissue swelling without discrete fluid collection/abscess. This may reflect changes of recent trauma; however, periorbital cellulitis could have a similar appearance.   2.  No evidence for intraorbital inflammation or trauma.         CT Head w/o Contrast   Final Result   IMPRESSION:   HEAD CT:   1.  No CT evidence for acute intracranial process.   2.  Frontal scalp swelling, potentially contusion, without associated fracture.   3.  Brain atrophy and presumed chronic microvascular ischemic changes as above.      ORBIT CT:   1.  Left preseptal periorbital soft tissue swelling without discrete fluid collection/abscess. This may reflect changes of recent trauma; however, periorbital cellulitis could have a similar appearance.   2.  No evidence for intraorbital inflammation or trauma.             ED Course      Medications Administered   Medications   tetracaine (PONTOCAINE) 0.5 % ophthalmic solution 2 drop (2 drops Left Eye $Given 3/13/25 1243)   fluorescein (FUL-JESSI) ophthalmic strip 1 strip (1 strip Left Eye $Given 3/13/25 1243)   iopamidol (ISOVUE-370) solution 67 mL (67 mLs Intravenous $Given 3/13/25 1331)   Saline (60 mLs As instructed $Given 3/13/25 1331)       Medical Decision Making / Diagnosis     MDM   Lizzeth العلي is a 89 year old female who presents with her daughters for facial wound.  Her family had presumed that she fell.  There was no actual witnessed fall though.  I was shown photographs from 3 days ago for comparison.  Appears that she had early vesicular changes then.  Her exam is clearly consistent with a V1 herpes zoster.  There is no lesion on the nose to suggest nasal cellular involvement.  No lesion in the ear to suggest [place] syndrome.  No evidence of disseminated zoster.  I considered herpes zoster ophthalmicus.  There is no  fluorescein uptake over the cornea and definitely no dendritic lesions.    Head CT negative.  She may be developing a bacterial cellulitis as she has more erythema and drainage especially around the eye.  CT of the orbit negative for any postseptal changes.    The patient's lab work looks good.  She is afebrile and her pain is well-controlled.  She is appropriate for trial of outpatient management.  She will have Valtrex as well as Keflex.  She initially was prescribed topical ganciclovir to prevent any corneal involvement.  The family called back later and said that it was not covered by their insurance.  She was then prescribed Viroptic.    They were given follow-up for ophthalmology with instructions to be seen in the short-term.  I discussed with the patient's family very strict return precautions for worsening of the rash, increasing cellulitis, or any corneal involvement.    Disposition   The patient was discharged.     Diagnosis     ICD-10-CM    1. Herpes zoster without complication  B02.9       2. Cellulitis of face  L03.211            Discharge Medications   Discharge Medication List as of 3/13/2025  2:52 PM        START taking these medications    Details   cephALEXin (KEFLEX) 500 MG capsule Take 1 capsule (500 mg) by mouth 4 times daily for 10 days., Disp-40 capsule, R-0, E-Prescribe      ganciclovir (ZIRGAN) 0.15 % ophthalmic gel Place 1 drop Into the left eye 4 times daily for 7 days., Disp-5 g, R-0, E-Prescribe      valACYclovir (VALTREX) 1000 mg tablet Take 1 tablet (1,000 mg) by mouth 3 times daily for 7 days., Disp-21 tablet, R-0, E-Prescribe                     Jonnie Witt MD  03/13/25 1913

## 2025-08-24 ENCOUNTER — APPOINTMENT (OUTPATIENT)
Dept: GENERAL RADIOLOGY | Facility: CLINIC | Age: OVER 89
End: 2025-08-24
Attending: EMERGENCY MEDICINE
Payer: COMMERCIAL

## 2025-08-24 ENCOUNTER — HOSPITAL ENCOUNTER (INPATIENT)
Facility: CLINIC | Age: OVER 89
LOS: 2 days | Discharge: HOME | End: 2025-08-26
Attending: EMERGENCY MEDICINE | Admitting: HOSPITALIST
Payer: COMMERCIAL

## 2025-08-24 ENCOUNTER — APPOINTMENT (OUTPATIENT)
Dept: CT IMAGING | Facility: CLINIC | Age: OVER 89
End: 2025-08-24
Attending: EMERGENCY MEDICINE
Payer: COMMERCIAL

## 2025-08-24 ENCOUNTER — APPOINTMENT (OUTPATIENT)
Dept: ULTRASOUND IMAGING | Facility: CLINIC | Age: OVER 89
End: 2025-08-24
Attending: EMERGENCY MEDICINE
Payer: COMMERCIAL

## 2025-08-24 PROBLEM — K83.1 OBSTRUCTIVE JAUNDICE (H): Status: ACTIVE | Noted: 2025-08-24

## 2025-08-24 ASSESSMENT — ACTIVITIES OF DAILY LIVING (ADL)
ADLS_ACUITY_SCORE: 41
ADLS_ACUITY_SCORE: 60
ADLS_ACUITY_SCORE: 60
ADLS_ACUITY_SCORE: 41
ADLS_ACUITY_SCORE: 41
ADLS_ACUITY_SCORE: 60
ADLS_ACUITY_SCORE: 41

## 2025-08-25 ASSESSMENT — ACTIVITIES OF DAILY LIVING (ADL)
ADLS_ACUITY_SCORE: 60
ADLS_ACUITY_SCORE: 60
ADLS_ACUITY_SCORE: 65
ADLS_ACUITY_SCORE: 60
ADLS_ACUITY_SCORE: 65
ADLS_ACUITY_SCORE: 65
ADLS_ACUITY_SCORE: 60
ADLS_ACUITY_SCORE: 60
ADLS_ACUITY_SCORE: 65
ADLS_ACUITY_SCORE: 60
ADLS_ACUITY_SCORE: 65
ADLS_ACUITY_SCORE: 60
ADLS_ACUITY_SCORE: 65
ADLS_ACUITY_SCORE: 60
ADLS_ACUITY_SCORE: 65
ADLS_ACUITY_SCORE: 61
ADLS_ACUITY_SCORE: 65
ADLS_ACUITY_SCORE: 60
ADLS_ACUITY_SCORE: 65
ADLS_ACUITY_SCORE: 60

## 2025-08-26 ASSESSMENT — ACTIVITIES OF DAILY LIVING (ADL)
ADLS_ACUITY_SCORE: 64
ADLS_ACUITY_SCORE: 66
ADLS_ACUITY_SCORE: 65
ADLS_ACUITY_SCORE: 65
ADLS_ACUITY_SCORE: 66
ADLS_ACUITY_SCORE: 64
ADLS_ACUITY_SCORE: 67
ADLS_ACUITY_SCORE: 65
ADLS_ACUITY_SCORE: 65
ADLS_ACUITY_SCORE: 66
ADLS_ACUITY_SCORE: 66
ADLS_ACUITY_SCORE: 65
ADLS_ACUITY_SCORE: 66
ADLS_ACUITY_SCORE: 66
ADLS_ACUITY_SCORE: 64
ADLS_ACUITY_SCORE: 66